# Patient Record
Sex: FEMALE | NOT HISPANIC OR LATINO | URBAN - METROPOLITAN AREA
[De-identification: names, ages, dates, MRNs, and addresses within clinical notes are randomized per-mention and may not be internally consistent; named-entity substitution may affect disease eponyms.]

---

## 2024-11-21 ENCOUNTER — TELEPHONE (OUTPATIENT)
Dept: OBSTETRICS AND GYNECOLOGY | Facility: CLINIC | Age: 49
End: 2024-11-21
Payer: MEDICAID

## 2024-11-21 NOTE — TELEPHONE ENCOUNTER
----- Message from Desirae sent at 11/21/2024 11:25 AM CST -----  Regarding: appointment access  Contact: Yandy  .Type:  Sooner Apoointment Request    Caller is requesting a sooner appointment.  Caller declined first available appointment listed below.  Caller will not accept being placed on the waitlist and is requesting a message be sent to doctor.  Name of Caller:Yandy  When is the first available appointment?  Symptoms: missed period 3 months, hot flashes, menopause consult  Would the patient rather a call back or a response via My [facility]? call  Best Call Back Number: [phone] (home)    Additional Information: Medicaid/ new patient/ any provider